# Patient Record
Sex: FEMALE | Race: WHITE | Employment: OTHER | URBAN - METROPOLITAN AREA
[De-identification: names, ages, dates, MRNs, and addresses within clinical notes are randomized per-mention and may not be internally consistent; named-entity substitution may affect disease eponyms.]

---

## 2017-10-23 ENCOUNTER — HOSPITAL ENCOUNTER (OUTPATIENT)
Age: 67
Setting detail: OUTPATIENT SURGERY
Discharge: HOME | End: 2017-10-23
Attending: SPECIALIST | Admitting: SPECIALIST
Payer: COMMERCIAL

## 2017-10-23 ENCOUNTER — ANESTHESIA (OUTPATIENT)
Dept: ENDOSCOPY | Age: 67
End: 2017-10-23
Payer: COMMERCIAL

## 2017-10-23 ENCOUNTER — ANESTHESIA EVENT (OUTPATIENT)
Dept: ENDOSCOPY | Age: 67
End: 2017-10-23
Payer: COMMERCIAL

## 2017-10-23 VITALS
DIASTOLIC BLOOD PRESSURE: 67 MMHG | RESPIRATION RATE: 23 BRPM | SYSTOLIC BLOOD PRESSURE: 142 MMHG | OXYGEN SATURATION: 99 %

## 2017-10-23 VITALS
OXYGEN SATURATION: 99 % | HEART RATE: 88 BPM | RESPIRATION RATE: 16 BRPM | DIASTOLIC BLOOD PRESSURE: 73 MMHG | HEIGHT: 65 IN | WEIGHT: 164 LBS | TEMPERATURE: 97.9 F | SYSTOLIC BLOOD PRESSURE: 142 MMHG | BODY MASS INDEX: 27.32 KG/M2

## 2017-10-23 PROCEDURE — 7100000010 HC PHASE II RECOVERY - FIRST 15 MIN: Performed by: SPECIALIST

## 2017-10-23 PROCEDURE — 3700000000 HC ANESTHESIA ATTENDED CARE: Performed by: SPECIALIST

## 2017-10-23 PROCEDURE — 3609027000 HC COLONOSCOPY: Performed by: SPECIALIST

## 2017-10-23 PROCEDURE — 88305 TISSUE EXAM BY PATHOLOGIST: CPT

## 2017-10-23 PROCEDURE — 3700000001 HC ADD 15 MINUTES (ANESTHESIA): Performed by: SPECIALIST

## 2017-10-23 PROCEDURE — 2500000003 HC RX 250 WO HCPCS: Performed by: NURSE ANESTHETIST, CERTIFIED REGISTERED

## 2017-10-23 PROCEDURE — 6360000002 HC RX W HCPCS: Performed by: NURSE ANESTHETIST, CERTIFIED REGISTERED

## 2017-10-23 PROCEDURE — 2580000003 HC RX 258: Performed by: SPECIALIST

## 2017-10-23 PROCEDURE — 7100000011 HC PHASE II RECOVERY - ADDTL 15 MIN: Performed by: SPECIALIST

## 2017-10-23 RX ORDER — ACETAMINOPHEN, ASPIRIN AND CAFFEINE 250; 250; 65 MG/1; MG/1; MG/1
1 TABLET, FILM COATED ORAL EVERY 6 HOURS PRN
COMMUNITY
End: 2019-10-11

## 2017-10-23 RX ORDER — PROPOFOL 10 MG/ML
INJECTION, EMULSION INTRAVENOUS PRN
Status: DISCONTINUED | OUTPATIENT
Start: 2017-10-23 | End: 2017-10-23 | Stop reason: SDUPTHER

## 2017-10-23 RX ORDER — LIDOCAINE HYDROCHLORIDE 10 MG/ML
INJECTION, SOLUTION INFILTRATION; PERINEURAL PRN
Status: DISCONTINUED | OUTPATIENT
Start: 2017-10-23 | End: 2017-10-23 | Stop reason: SDUPTHER

## 2017-10-23 RX ORDER — IBUPROFEN 200 MG
1 CAPSULE ORAL DAILY
COMMUNITY

## 2017-10-23 RX ORDER — LOSARTAN POTASSIUM 100 MG/1
100 TABLET ORAL DAILY
COMMUNITY

## 2017-10-23 RX ORDER — PREDNISONE 20 MG/1
10 TABLET ORAL DAILY
COMMUNITY
End: 2019-10-11 | Stop reason: ALTCHOICE

## 2017-10-23 RX ORDER — TRAZODONE HYDROCHLORIDE 100 MG/1
100 TABLET ORAL NIGHTLY
COMMUNITY

## 2017-10-23 RX ORDER — SODIUM CHLORIDE 0.9 % (FLUSH) 0.9 %
10 SYRINGE (ML) INJECTION PRN
Status: DISCONTINUED | OUTPATIENT
Start: 2017-10-23 | End: 2017-10-23 | Stop reason: HOSPADM

## 2017-10-23 RX ORDER — DULOXETIN HYDROCHLORIDE 30 MG/1
30 CAPSULE, DELAYED RELEASE ORAL DAILY
COMMUNITY

## 2017-10-23 RX ORDER — LEVOTHYROXINE AND LIOTHYRONINE 38; 9 UG/1; UG/1
60 TABLET ORAL DAILY
COMMUNITY

## 2017-10-23 RX ORDER — DULOXETIN HYDROCHLORIDE 60 MG/1
60 CAPSULE, DELAYED RELEASE ORAL EVERY EVENING
COMMUNITY

## 2017-10-23 RX ORDER — GLYCOPYRROLATE 0.2 MG/ML
INJECTION INTRAMUSCULAR; INTRAVENOUS PRN
Status: DISCONTINUED | OUTPATIENT
Start: 2017-10-23 | End: 2017-10-23 | Stop reason: SDUPTHER

## 2017-10-23 RX ORDER — SODIUM CHLORIDE 0.9 % (FLUSH) 0.9 %
10 SYRINGE (ML) INJECTION EVERY 12 HOURS SCHEDULED
Status: DISCONTINUED | OUTPATIENT
Start: 2017-10-23 | End: 2017-10-23 | Stop reason: HOSPADM

## 2017-10-23 RX ORDER — SODIUM CHLORIDE 9 MG/ML
INJECTION, SOLUTION INTRAVENOUS CONTINUOUS
Status: DISCONTINUED | OUTPATIENT
Start: 2017-10-23 | End: 2017-10-23 | Stop reason: HOSPADM

## 2017-10-23 RX ORDER — LIDOCAINE HYDROCHLORIDE 10 MG/ML
1 INJECTION, SOLUTION EPIDURAL; INFILTRATION; INTRACAUDAL; PERINEURAL
Status: DISCONTINUED | OUTPATIENT
Start: 2017-10-23 | End: 2017-10-23 | Stop reason: HOSPADM

## 2017-10-23 RX ORDER — CYCLOSPORINE 100 MG/1
100 CAPSULE, GELATIN COATED ORAL 2 TIMES DAILY
COMMUNITY
End: 2018-08-02 | Stop reason: ALTCHOICE

## 2017-10-23 RX ORDER — ONDANSETRON 2 MG/ML
4 INJECTION INTRAMUSCULAR; INTRAVENOUS
Status: DISCONTINUED | OUTPATIENT
Start: 2017-10-23 | End: 2017-10-23 | Stop reason: HOSPADM

## 2017-10-23 RX ADMIN — SODIUM CHLORIDE: 9 INJECTION, SOLUTION INTRAVENOUS at 11:47

## 2017-10-23 RX ADMIN — GLYCOPYRROLATE 0.2 MG: 0.2 INJECTION INTRAMUSCULAR; INTRAVENOUS at 12:07

## 2017-10-23 RX ADMIN — PROPOFOL 20 MG: 10 INJECTION, EMULSION INTRAVENOUS at 12:05

## 2017-10-23 RX ADMIN — PROPOFOL 20 MG: 10 INJECTION, EMULSION INTRAVENOUS at 12:15

## 2017-10-23 RX ADMIN — PROPOFOL 20 MG: 10 INJECTION, EMULSION INTRAVENOUS at 12:00

## 2017-10-23 RX ADMIN — PROPOFOL 20 MG: 10 INJECTION, EMULSION INTRAVENOUS at 11:56

## 2017-10-23 RX ADMIN — PROPOFOL 30 MG: 10 INJECTION, EMULSION INTRAVENOUS at 11:53

## 2017-10-23 RX ADMIN — PROPOFOL 20 MG: 10 INJECTION, EMULSION INTRAVENOUS at 12:04

## 2017-10-23 RX ADMIN — PROPOFOL 20 MG: 10 INJECTION, EMULSION INTRAVENOUS at 12:07

## 2017-10-23 RX ADMIN — PROPOFOL 10 MG: 10 INJECTION, EMULSION INTRAVENOUS at 12:13

## 2017-10-23 RX ADMIN — PROPOFOL 20 MG: 10 INJECTION, EMULSION INTRAVENOUS at 12:01

## 2017-10-23 RX ADMIN — PROPOFOL 20 MG: 10 INJECTION, EMULSION INTRAVENOUS at 11:54

## 2017-10-23 RX ADMIN — PROPOFOL 20 MG: 10 INJECTION, EMULSION INTRAVENOUS at 12:02

## 2017-10-23 RX ADMIN — PROPOFOL 20 MG: 10 INJECTION, EMULSION INTRAVENOUS at 12:11

## 2017-10-23 RX ADMIN — PROPOFOL 20 MG: 10 INJECTION, EMULSION INTRAVENOUS at 11:58

## 2017-10-23 RX ADMIN — PROPOFOL 20 MG: 10 INJECTION, EMULSION INTRAVENOUS at 12:12

## 2017-10-23 RX ADMIN — PROPOFOL 100 MG: 10 INJECTION, EMULSION INTRAVENOUS at 11:52

## 2017-10-23 RX ADMIN — PROPOFOL 20 MG: 10 INJECTION, EMULSION INTRAVENOUS at 12:09

## 2017-10-23 RX ADMIN — PROPOFOL 20 MG: 10 INJECTION, EMULSION INTRAVENOUS at 12:17

## 2017-10-23 RX ADMIN — LIDOCAINE HYDROCHLORIDE 30 MG: 10 INJECTION, SOLUTION INFILTRATION; PERINEURAL at 11:52

## 2017-10-23 ASSESSMENT — PAIN - FUNCTIONAL ASSESSMENT: PAIN_FUNCTIONAL_ASSESSMENT: 0-10

## 2017-10-23 NOTE — ANESTHESIA PRE PROCEDURE
PRN Aron Lambert MD        lidocaine PF 1 % injection 1 mL  1 mL Intradermal Once PRN Aron Lambert MD           Allergies:  No Known Allergies    Problem List:  There is no problem list on file for this patient. Past Medical History:        Diagnosis Date    Factor 5 Leiden mutation, heterozygous ([address]     Fibromyalgia     Hx of blood clots     leg    Hyperlipidemia     Pyodermic gangrenosum        Past Surgical History:        Procedure Laterality Date    HIP ARTHROPLASTY Right 2014       Social History:    Social History   Substance Use Topics    Smoking status: Not on file    Smokeless tobacco: Not on file    Alcohol use Not on file                                Counseling given: Not Answered      Vital Signs (Current):   Vitals:    10/23/17 1113   BP: (!) 178/99   Pulse: 98   Resp: 16   Temp: 36.6 °C (97.9 °F)   TempSrc: Temporal   SpO2: 99%   Weight: 164 lb (74.4 kg)   Height: 5' 5\" (1.651 m)                                              BP Readings from Last 3 Encounters:   10/23/17 (!) 178/99       NPO Status:                                                                                 BMI:   Wt Readings from Last 3 Encounters:   10/23/17 164 lb (74.4 kg)     Body mass index is 27.29 kg/m².     CBC:   Lab Results   Component Value Date    WBC 8.3 10/10/2014    RBC 4.61 10/10/2014    HGB 14.5 10/10/2014    HCT 44.5 10/10/2014    MCV 96.6 10/10/2014    RDW 13.2 10/10/2014     10/10/2014       CMP:   Lab Results   Component Value Date     10/10/2014    K 3.8 10/10/2014     10/10/2014    CO2 22 10/10/2014    BUN 14 10/10/2014    CREATININE 0.40 10/10/2014    GFRAA >60.0 10/10/2014    LABGLOM >60.0 10/10/2014    GLUCOSE 64 10/10/2014    PROT 6.0 07/22/2014    CALCIUM 9.1 10/10/2014    BILITOT 0.3 07/22/2014    ALKPHOS 90 07/22/2014    AST 18 07/22/2014    ALT 17 07/22/2014       POC Tests: No results for input(s): POCGLU, POCNA, POCK, POCCL, POCBUN, POCHEMO, POCHCT in the last 72 hours. Coags:   Lab Results   Component Value Date    PROTIME 10.4 10/10/2014    INR 1.0 10/10/2014    APTT 27.3 10/10/2014       HCG (If Applicable): No results found for: PREGTESTUR, PREGSERUM, HCG, HCGQUANT     ABGs: No results found for: PHART, PO2ART, KTW6BYJ, MQF6IEH, BEART, L6YVDPDC     Type & Screen (If Applicable):  No results found for: LABABO, [address]    Anesthesia Evaluation  Patient summary reviewed and Nursing notes reviewed  Airway: Mallampati: II  TM distance: >3 FB   Neck ROM: full  Mouth opening: > = 3 FB Dental: normal exam         Pulmonary:negative ROS and normal exam                               Cardiovascular:    (+) hyperlipidemia                  Neuro/Psych:   {neg ROS              GI/Hepatic/Renal: neg ROS            Endo/Other: negative ROS                    Abdominal:           Vascular:                                      Anesthesia Plan      MAC     ASA 2             Anesthetic plan and risks discussed with patient. Plan discussed with CRNA.                   Josiane Andujar CRNA   10/23/2017

## 2017-12-26 ENCOUNTER — OFFICE VISIT (OUTPATIENT)
Dept: CARDIOLOGY | Age: 67
End: 2017-12-26

## 2017-12-26 VITALS
HEIGHT: 65 IN | BODY MASS INDEX: 27.82 KG/M2 | DIASTOLIC BLOOD PRESSURE: 90 MMHG | WEIGHT: 167 LBS | HEART RATE: 104 BPM | SYSTOLIC BLOOD PRESSURE: 160 MMHG

## 2017-12-26 DIAGNOSIS — Z72.0 TOBACCO ABUSE: ICD-10-CM

## 2017-12-26 DIAGNOSIS — I10 ESSENTIAL HYPERTENSION: Primary | ICD-10-CM

## 2017-12-26 DIAGNOSIS — I70.229 CRITICAL LOWER LIMB ISCHEMIA (HCC): ICD-10-CM

## 2017-12-26 PROCEDURE — 93000 ELECTROCARDIOGRAM COMPLETE: CPT | Performed by: INTERNAL MEDICINE

## 2017-12-26 PROCEDURE — G8484 FLU IMMUNIZE NO ADMIN: HCPCS | Performed by: INTERNAL MEDICINE

## 2017-12-26 PROCEDURE — 3014F SCREEN MAMMO DOC REV: CPT | Performed by: INTERNAL MEDICINE

## 2017-12-26 PROCEDURE — 4040F PNEUMOC VAC/ADMIN/RCVD: CPT | Performed by: INTERNAL MEDICINE

## 2017-12-26 PROCEDURE — G8400 PT W/DXA NO RESULTS DOC: HCPCS | Performed by: INTERNAL MEDICINE

## 2017-12-26 PROCEDURE — G8427 DOCREV CUR MEDS BY ELIG CLIN: HCPCS | Performed by: INTERNAL MEDICINE

## 2017-12-26 PROCEDURE — 3017F COLORECTAL CA SCREEN DOC REV: CPT | Performed by: INTERNAL MEDICINE

## 2017-12-26 PROCEDURE — 99204 OFFICE O/P NEW MOD 45 MIN: CPT | Performed by: INTERNAL MEDICINE

## 2017-12-26 PROCEDURE — 1123F ACP DISCUSS/DSCN MKR DOCD: CPT | Performed by: INTERNAL MEDICINE

## 2017-12-26 PROCEDURE — 4004F PT TOBACCO SCREEN RCVD TLK: CPT | Performed by: INTERNAL MEDICINE

## 2017-12-26 PROCEDURE — 1090F PRES/ABSN URINE INCON ASSESS: CPT | Performed by: INTERNAL MEDICINE

## 2017-12-26 PROCEDURE — G8419 CALC BMI OUT NRM PARAM NOF/U: HCPCS | Performed by: INTERNAL MEDICINE

## 2017-12-29 ENCOUNTER — HOSPITAL ENCOUNTER (OUTPATIENT)
Dept: CT IMAGING | Age: 67
Discharge: HOME | End: 2017-12-29
Payer: COMMERCIAL

## 2017-12-29 VITALS — SYSTOLIC BLOOD PRESSURE: 152 MMHG | HEART RATE: 105 BPM | RESPIRATION RATE: 20 BRPM | DIASTOLIC BLOOD PRESSURE: 89 MMHG

## 2017-12-29 DIAGNOSIS — I70.229 CRITICAL LOWER LIMB ISCHEMIA (HCC): ICD-10-CM

## 2017-12-29 PROCEDURE — 2580000003 HC RX 258: Performed by: INTERNAL MEDICINE

## 2017-12-29 PROCEDURE — 6360000004 HC RX CONTRAST MEDICATION: Performed by: INTERNAL MEDICINE

## 2017-12-29 PROCEDURE — 75635 CT ANGIO ABDOMINAL ARTERIES: CPT

## 2017-12-29 RX ORDER — SODIUM CHLORIDE 0.9 % (FLUSH) 0.9 %
10 SYRINGE (ML) INJECTION PRN
Status: DISCONTINUED | OUTPATIENT
Start: 2017-12-29 | End: 2018-01-01 | Stop reason: HOSPADM

## 2017-12-29 RX ADMIN — IOPAMIDOL 125 ML: 612 INJECTION, SOLUTION INTRAVENOUS at 14:16

## 2017-12-29 RX ADMIN — SODIUM CHLORIDE, PRESERVATIVE FREE 10 ML: 5 INJECTION INTRAVENOUS at 14:16

## 2018-01-09 ENCOUNTER — OFFICE VISIT (OUTPATIENT)
Dept: CARDIOLOGY | Age: 68
End: 2018-01-09

## 2018-01-09 VITALS
HEIGHT: 65 IN | OXYGEN SATURATION: 98 % | WEIGHT: 168 LBS | BODY MASS INDEX: 27.99 KG/M2 | DIASTOLIC BLOOD PRESSURE: 70 MMHG | HEART RATE: 100 BPM | SYSTOLIC BLOOD PRESSURE: 136 MMHG

## 2018-01-09 DIAGNOSIS — I70.229 CRITICAL LOWER LIMB ISCHEMIA (HCC): Primary | ICD-10-CM

## 2018-01-09 DIAGNOSIS — I73.9 PAD (PERIPHERAL ARTERY DISEASE) (HCC): ICD-10-CM

## 2018-01-09 DIAGNOSIS — Z72.0 TOBACCO ABUSE: ICD-10-CM

## 2018-01-09 PROCEDURE — 3017F COLORECTAL CA SCREEN DOC REV: CPT | Performed by: INTERNAL MEDICINE

## 2018-01-09 PROCEDURE — G8427 DOCREV CUR MEDS BY ELIG CLIN: HCPCS | Performed by: INTERNAL MEDICINE

## 2018-01-09 PROCEDURE — 1123F ACP DISCUSS/DSCN MKR DOCD: CPT | Performed by: INTERNAL MEDICINE

## 2018-01-09 PROCEDURE — 4004F PT TOBACCO SCREEN RCVD TLK: CPT | Performed by: INTERNAL MEDICINE

## 2018-01-09 PROCEDURE — G8419 CALC BMI OUT NRM PARAM NOF/U: HCPCS | Performed by: INTERNAL MEDICINE

## 2018-01-09 PROCEDURE — 99214 OFFICE O/P EST MOD 30 MIN: CPT | Performed by: INTERNAL MEDICINE

## 2018-01-09 PROCEDURE — G8400 PT W/DXA NO RESULTS DOC: HCPCS | Performed by: INTERNAL MEDICINE

## 2018-01-09 PROCEDURE — 1090F PRES/ABSN URINE INCON ASSESS: CPT | Performed by: INTERNAL MEDICINE

## 2018-01-09 PROCEDURE — G8484 FLU IMMUNIZE NO ADMIN: HCPCS | Performed by: INTERNAL MEDICINE

## 2018-01-09 PROCEDURE — 3014F SCREEN MAMMO DOC REV: CPT | Performed by: INTERNAL MEDICINE

## 2018-01-09 PROCEDURE — 4040F PNEUMOC VAC/ADMIN/RCVD: CPT | Performed by: INTERNAL MEDICINE

## 2018-01-17 ENCOUNTER — TELEPHONE (OUTPATIENT)
Dept: CARDIOLOGY | Age: 68
End: 2018-01-17

## 2018-01-24 ENCOUNTER — HOSPITAL ENCOUNTER (OUTPATIENT)
Dept: CARDIAC CATH/INVASIVE PROCEDURES | Age: 68
Discharge: HOME | End: 2018-01-24
Attending: INTERNAL MEDICINE | Admitting: INTERNAL MEDICINE
Payer: COMMERCIAL

## 2018-01-24 VITALS
HEIGHT: 65 IN | HEART RATE: 107 BPM | WEIGHT: 158 LBS | SYSTOLIC BLOOD PRESSURE: 169 MMHG | OXYGEN SATURATION: 100 % | BODY MASS INDEX: 26.33 KG/M2 | RESPIRATION RATE: 21 BRPM | DIASTOLIC BLOOD PRESSURE: 86 MMHG | TEMPERATURE: 97.6 F

## 2018-01-24 DIAGNOSIS — I70.229 CRITICAL LOWER LIMB ISCHEMIA (HCC): ICD-10-CM

## 2018-01-24 DIAGNOSIS — I73.9 PAD (PERIPHERAL ARTERY DISEASE) (HCC): ICD-10-CM

## 2018-01-24 LAB
ABO/RH: NORMAL
ANION GAP SERPL CALCULATED.3IONS-SCNC: 13 MEQ/L (ref 7–13)
ANTIBODY SCREEN: NORMAL
BUN BLDV-MCNC: 20 MG/DL (ref 8–23)
CALCIUM SERPL-MCNC: 9.3 MG/DL (ref 8.6–10.2)
CHLORIDE BLD-SCNC: 107 MEQ/L (ref 98–107)
CO2: 22 MEQ/L (ref 22–29)
CREAT SERPL-MCNC: 0.83 MG/DL (ref 0.5–0.9)
EKG ATRIAL RATE: 107 BPM
EKG ATRIAL RATE: 94 BPM
EKG P AXIS: 44 DEGREES
EKG P AXIS: 56 DEGREES
EKG P-R INTERVAL: 172 MS
EKG P-R INTERVAL: 176 MS
EKG Q-T INTERVAL: 384 MS
EKG Q-T INTERVAL: 392 MS
EKG QRS DURATION: 128 MS
EKG QRS DURATION: 130 MS
EKG QTC CALCULATION (BAZETT): 490 MS
EKG QTC CALCULATION (BAZETT): 512 MS
EKG R AXIS: -55 DEGREES
EKG R AXIS: -57 DEGREES
EKG T AXIS: 29 DEGREES
EKG T AXIS: 35 DEGREES
EKG VENTRICULAR RATE: 107 BPM
EKG VENTRICULAR RATE: 94 BPM
GFR AFRICAN AMERICAN: >60
GFR NON-AFRICAN AMERICAN: >60
GLUCOSE BLD-MCNC: 80 MG/DL (ref 74–109)
HCT VFR BLD CALC: 37.8 % (ref 37–47)
HEMOGLOBIN: 12 G/DL (ref 12–16)
MCH RBC QN AUTO: 29.3 PG (ref 27–31.3)
MCHC RBC AUTO-ENTMCNC: 31.6 % (ref 33–37)
MCV RBC AUTO: 92.6 FL (ref 82–100)
PDW BLD-RTO: 16.1 % (ref 11.5–14.5)
PLATELET # BLD: 512 K/UL (ref 130–400)
POTASSIUM REFLEX MAGNESIUM: 4.4 MEQ/L (ref 3.5–5.1)
RBC # BLD: 4.08 M/UL (ref 4.2–5.4)
SODIUM BLD-SCNC: 142 MEQ/L (ref 132–144)
WBC # BLD: 10.7 K/UL (ref 4.8–10.8)

## 2018-01-24 PROCEDURE — 2580000003 HC RX 258

## 2018-01-24 PROCEDURE — 2500000003 HC RX 250 WO HCPCS

## 2018-01-24 PROCEDURE — 93005 ELECTROCARDIOGRAM TRACING: CPT

## 2018-01-24 PROCEDURE — 86901 BLOOD TYPING SEROLOGIC RH(D): CPT

## 2018-01-24 PROCEDURE — 36200 PLACE CATHETER IN AORTA: CPT | Performed by: INTERNAL MEDICINE

## 2018-01-24 PROCEDURE — 75625 CONTRAST EXAM ABDOMINL AORTA: CPT | Performed by: INTERNAL MEDICINE

## 2018-01-24 PROCEDURE — C1725 CATH, TRANSLUMIN NON-LASER: HCPCS

## 2018-01-24 PROCEDURE — 6360000002 HC RX W HCPCS

## 2018-01-24 PROCEDURE — 6370000000 HC RX 637 (ALT 250 FOR IP): Performed by: INTERNAL MEDICINE

## 2018-01-24 PROCEDURE — 86850 RBC ANTIBODY SCREEN: CPT

## 2018-01-24 PROCEDURE — C1769 GUIDE WIRE: HCPCS

## 2018-01-24 PROCEDURE — 86900 BLOOD TYPING SEROLOGIC ABO: CPT

## 2018-01-24 PROCEDURE — 80048 BASIC METABOLIC PNL TOTAL CA: CPT

## 2018-01-24 PROCEDURE — 75716 ARTERY X-RAYS ARMS/LEGS: CPT | Performed by: INTERNAL MEDICINE

## 2018-01-24 PROCEDURE — 75774 ARTERY X-RAY EACH VESSEL: CPT | Performed by: INTERNAL MEDICINE

## 2018-01-24 PROCEDURE — 93010 ELECTROCARDIOGRAM REPORT: CPT | Performed by: INTERNAL MEDICINE

## 2018-01-24 PROCEDURE — 2580000003 HC RX 258: Performed by: INTERNAL MEDICINE

## 2018-01-24 PROCEDURE — 36245 INS CATH ABD/L-EXT ART 1ST: CPT | Performed by: INTERNAL MEDICINE

## 2018-01-24 PROCEDURE — C1894 INTRO/SHEATH, NON-LASER: HCPCS

## 2018-01-24 PROCEDURE — 85027 COMPLETE CBC AUTOMATED: CPT

## 2018-01-24 RX ORDER — CIPROFLOXACIN 500 MG/1
500 TABLET, FILM COATED ORAL 2 TIMES DAILY
COMMUNITY
Start: 2018-01-17 | End: 2018-01-26

## 2018-01-24 RX ORDER — DIAZEPAM 5 MG/1
5 TABLET ORAL
Status: DISCONTINUED | OUTPATIENT
Start: 2018-01-24 | End: 2018-01-24 | Stop reason: HOSPADM

## 2018-01-24 RX ORDER — HYDRALAZINE HYDROCHLORIDE 20 MG/ML
10 INJECTION INTRAMUSCULAR; INTRAVENOUS EVERY 10 MIN PRN
Status: DISCONTINUED | OUTPATIENT
Start: 2018-01-24 | End: 2018-01-24 | Stop reason: HOSPADM

## 2018-01-24 RX ORDER — ACETAMINOPHEN 325 MG/1
650 TABLET ORAL EVERY 4 HOURS PRN
Status: DISCONTINUED | OUTPATIENT
Start: 2018-01-24 | End: 2018-01-24 | Stop reason: HOSPADM

## 2018-01-24 RX ORDER — LABETALOL HYDROCHLORIDE 5 MG/ML
10 INJECTION, SOLUTION INTRAVENOUS EVERY 30 MIN PRN
Status: DISCONTINUED | OUTPATIENT
Start: 2018-01-24 | End: 2018-01-24 | Stop reason: HOSPADM

## 2018-01-24 RX ORDER — ONDANSETRON 2 MG/ML
4 INJECTION INTRAMUSCULAR; INTRAVENOUS EVERY 6 HOURS PRN
Status: DISCONTINUED | OUTPATIENT
Start: 2018-01-24 | End: 2018-01-24 | Stop reason: HOSPADM

## 2018-01-24 RX ORDER — SODIUM CHLORIDE 0.9 % (FLUSH) 0.9 %
10 SYRINGE (ML) INJECTION PRN
Status: DISCONTINUED | OUTPATIENT
Start: 2018-01-24 | End: 2018-01-24 | Stop reason: HOSPADM

## 2018-01-24 RX ORDER — SODIUM CHLORIDE 9 MG/ML
INJECTION, SOLUTION INTRAVENOUS CONTINUOUS
Status: DISCONTINUED | OUTPATIENT
Start: 2018-01-24 | End: 2018-01-24 | Stop reason: HOSPADM

## 2018-01-24 RX ORDER — SODIUM CHLORIDE 0.9 % (FLUSH) 0.9 %
10 SYRINGE (ML) INJECTION EVERY 12 HOURS SCHEDULED
Status: DISCONTINUED | OUTPATIENT
Start: 2018-01-24 | End: 2018-01-24 | Stop reason: HOSPADM

## 2018-01-24 RX ORDER — ASPIRIN 81 MG/1
81 TABLET, CHEWABLE ORAL ONCE
Status: COMPLETED | OUTPATIENT
Start: 2018-01-24 | End: 2018-01-24

## 2018-01-24 RX ORDER — NITROGLYCERIN 0.4 MG/1
0.4 TABLET SUBLINGUAL EVERY 5 MIN PRN
Status: DISCONTINUED | OUTPATIENT
Start: 2018-01-24 | End: 2018-01-24 | Stop reason: HOSPADM

## 2018-01-24 RX ORDER — ALPRAZOLAM 0.5 MG/1
0.5 TABLET ORAL
Status: DISCONTINUED | OUTPATIENT
Start: 2018-01-24 | End: 2018-01-24 | Stop reason: HOSPADM

## 2018-01-24 RX ADMIN — ASPIRIN 81 MG 81 MG: 81 TABLET ORAL at 07:27

## 2018-01-24 RX ADMIN — SODIUM CHLORIDE: 9 INJECTION, SOLUTION INTRAVENOUS at 07:27

## 2018-01-24 NOTE — PROGRESS NOTES
Elder Settles is holding manual pressure and patient's heart rate is flucuating normal sinus rhythm to sinus tach.

## 2018-01-24 NOTE — PROGRESS NOTES
Patient returned from cath lab. 4Fr sheath in left groin. Site is soft and no bleeding. Vitals are stable.

## 2018-01-25 ENCOUNTER — TELEPHONE (OUTPATIENT)
Dept: CARDIOLOGY | Age: 68
End: 2018-01-25

## 2018-01-25 DIAGNOSIS — I49.9 IRREGULAR HEART RATE: Primary | ICD-10-CM

## 2018-01-25 PROCEDURE — 93010 ELECTROCARDIOGRAM REPORT: CPT | Performed by: INTERNAL MEDICINE

## 2018-02-05 ENCOUNTER — NURSE ONLY (OUTPATIENT)
Dept: CARDIOLOGY CLINIC | Age: 68
End: 2018-02-05

## 2018-02-05 DIAGNOSIS — I49.9 IRREGULAR HEART BEAT: Primary | ICD-10-CM

## 2018-02-07 ENCOUNTER — NURSE ONLY (OUTPATIENT)
Dept: CARDIOLOGY CLINIC | Age: 68
End: 2018-02-07

## 2018-02-07 DIAGNOSIS — I49.9 IRREGULAR HEART BEAT: Primary | ICD-10-CM

## 2018-02-08 ENCOUNTER — OFFICE VISIT (OUTPATIENT)
Dept: CARDIOLOGY CLINIC | Age: 68
End: 2018-02-08
Payer: COMMERCIAL

## 2018-02-08 VITALS
HEART RATE: 60 BPM | HEIGHT: 65 IN | DIASTOLIC BLOOD PRESSURE: 80 MMHG | SYSTOLIC BLOOD PRESSURE: 120 MMHG | WEIGHT: 169 LBS | BODY MASS INDEX: 28.16 KG/M2

## 2018-02-08 DIAGNOSIS — Z72.0 TOBACCO ABUSE: ICD-10-CM

## 2018-02-08 DIAGNOSIS — I73.9 PAD (PERIPHERAL ARTERY DISEASE) (HCC): ICD-10-CM

## 2018-02-08 DIAGNOSIS — I70.229 CRITICAL LOWER LIMB ISCHEMIA (HCC): Primary | ICD-10-CM

## 2018-02-08 PROCEDURE — 4040F PNEUMOC VAC/ADMIN/RCVD: CPT | Performed by: INTERNAL MEDICINE

## 2018-02-08 PROCEDURE — 99213 OFFICE O/P EST LOW 20 MIN: CPT | Performed by: INTERNAL MEDICINE

## 2018-02-08 PROCEDURE — 1123F ACP DISCUSS/DSCN MKR DOCD: CPT | Performed by: INTERNAL MEDICINE

## 2018-02-08 PROCEDURE — 4004F PT TOBACCO SCREEN RCVD TLK: CPT | Performed by: INTERNAL MEDICINE

## 2018-02-08 PROCEDURE — 1090F PRES/ABSN URINE INCON ASSESS: CPT | Performed by: INTERNAL MEDICINE

## 2018-02-08 PROCEDURE — G8427 DOCREV CUR MEDS BY ELIG CLIN: HCPCS | Performed by: INTERNAL MEDICINE

## 2018-02-08 PROCEDURE — G8400 PT W/DXA NO RESULTS DOC: HCPCS | Performed by: INTERNAL MEDICINE

## 2018-02-08 PROCEDURE — G8484 FLU IMMUNIZE NO ADMIN: HCPCS | Performed by: INTERNAL MEDICINE

## 2018-02-08 PROCEDURE — G8419 CALC BMI OUT NRM PARAM NOF/U: HCPCS | Performed by: INTERNAL MEDICINE

## 2018-02-08 PROCEDURE — 3017F COLORECTAL CA SCREEN DOC REV: CPT | Performed by: INTERNAL MEDICINE

## 2018-02-08 PROCEDURE — 3014F SCREEN MAMMO DOC REV: CPT | Performed by: INTERNAL MEDICINE

## 2018-02-08 RX ORDER — METOPROLOL TARTRATE 50 MG/1
25 TABLET, FILM COATED ORAL 2 TIMES DAILY
Qty: 180 TABLET | Refills: 3 | Status: SHIPPED | OUTPATIENT
Start: 2018-02-08 | End: 2019-04-21 | Stop reason: SDUPTHER

## 2018-08-02 ENCOUNTER — OFFICE VISIT (OUTPATIENT)
Dept: CARDIOLOGY CLINIC | Age: 68
End: 2018-08-02
Payer: COMMERCIAL

## 2018-08-02 VITALS
HEART RATE: 72 BPM | WEIGHT: 171 LBS | OXYGEN SATURATION: 94 % | BODY MASS INDEX: 28.49 KG/M2 | SYSTOLIC BLOOD PRESSURE: 130 MMHG | RESPIRATION RATE: 14 BRPM | HEIGHT: 65 IN | DIASTOLIC BLOOD PRESSURE: 74 MMHG

## 2018-08-02 DIAGNOSIS — Z72.0 TOBACCO ABUSE: ICD-10-CM

## 2018-08-02 DIAGNOSIS — Z01.818 PRE-OPERATIVE CLEARANCE: ICD-10-CM

## 2018-08-02 DIAGNOSIS — Z01.810 PRE-OPERATIVE CARDIOVASCULAR EXAMINATION: Primary | ICD-10-CM

## 2018-08-02 DIAGNOSIS — I73.9 PAD (PERIPHERAL ARTERY DISEASE) (HCC): ICD-10-CM

## 2018-08-02 PROCEDURE — 4004F PT TOBACCO SCREEN RCVD TLK: CPT | Performed by: INTERNAL MEDICINE

## 2018-08-02 PROCEDURE — G8427 DOCREV CUR MEDS BY ELIG CLIN: HCPCS | Performed by: INTERNAL MEDICINE

## 2018-08-02 PROCEDURE — 4040F PNEUMOC VAC/ADMIN/RCVD: CPT | Performed by: INTERNAL MEDICINE

## 2018-08-02 PROCEDURE — G8400 PT W/DXA NO RESULTS DOC: HCPCS | Performed by: INTERNAL MEDICINE

## 2018-08-02 PROCEDURE — 1123F ACP DISCUSS/DSCN MKR DOCD: CPT | Performed by: INTERNAL MEDICINE

## 2018-08-02 PROCEDURE — 1101F PT FALLS ASSESS-DOCD LE1/YR: CPT | Performed by: INTERNAL MEDICINE

## 2018-08-02 PROCEDURE — G8419 CALC BMI OUT NRM PARAM NOF/U: HCPCS | Performed by: INTERNAL MEDICINE

## 2018-08-02 PROCEDURE — 3017F COLORECTAL CA SCREEN DOC REV: CPT | Performed by: INTERNAL MEDICINE

## 2018-08-02 PROCEDURE — 1090F PRES/ABSN URINE INCON ASSESS: CPT | Performed by: INTERNAL MEDICINE

## 2018-08-02 PROCEDURE — 99213 OFFICE O/P EST LOW 20 MIN: CPT | Performed by: INTERNAL MEDICINE

## 2018-08-02 NOTE — PROGRESS NOTES
Chief Complaint   Patient presents with    Cardiac Clearance     Left Hip replacement 8/22/2018-[facility] kari Mckeon done 7/31/18 @ [facility]       12-26-17: Patient presents for initial medical evaluation. Patient is followed on a regular basis by Dr. Kaylee Scott MD. Was noted to have elevated HR at PCP office. Underwent some testing at [facility] and checked out ok. Does have right medial tibial wound, pyoderma gangrenosum, following with DR. Rucker at [facility] wound care center. Wound has been present since 5/2016 and is slow to heal. Has undergone many HBC tx and a skin graft. Was seen at [address] [facility] vein clinic and given stockings. EKG with ST at 104bpm today with RBBB. No hx of MI, CHF or arrhythmia. She does smoke 1ppd for 40+ years or so. No hx of DM. No recent stress test or LHC. 1-9-18: s/p abnormal b/l LE CTA with below the knee disease. Also reviewing the CTA myself there is a suspecious area in right CFA. Right LE wound is still slow to heal . Compliant with meds. Pt denies chest pain, dyspnea, dyspnea on exertion, change in exercise capacity, fatigue,  nausea, vomiting, diarrhea, constipation, motor weakness, insomnia, weight loss, syncope, dizziness, lightheadedness, palpitations, PND, orthopnea. 2-8-18: s/p normal LE angio. Following up with [facility] wound care. Noted to have Sinus tach during and after procedure. She was started on lopressor 25mg BID. S/p 48 hour holter with sinus tach, no malignant tachy or alfonso arrhythmias. She is on Eliquis for hx of DVT in right LE. Following with dr. Helena Hodgkins for veins. Wound is getting a little better. Continues to smoke. Pt denies chest pain, dyspnea, dyspnea on exertion, change in exercise capacity, fatigue,  nausea, vomiting, diarrhea, constipation, motor weakness, insomnia, weight loss, syncope, dizziness, lightheadedness, palpitations, PND, orthopnea, or claudication. 8-22-18 her right medial ankle wound healed up finally.  Needs to have pre op clearance 180 tablet 3    thyroid (ARMOUR) 60 MG tablet Take 60 mg by mouth daily       lansoprazole (PREVACID SOLUTAB) 30 MG disintegrating tablet Take 30 mg by mouth daily      DULoxetine (CYMBALTA) 30 MG extended release capsule Take 30 mg by mouth daily      predniSONE (DELTASONE) 20 MG tablet Take 10 mg by mouth daily       Progesterone Micronized (PROGESTERONE PO) Take 300 mg by mouth daily      traZODone (DESYREL) 100 MG tablet Take 100 mg by mouth nightly      calcium carbonate (OYSTER SHELL CALCIUM 500 MG) 1250 (500 Ca) MG tablet Take 1 tablet by mouth daily      aspirin-acetaminophen-caffeine (EXCEDRIN MIGRAINE) 250-250-65 MG per tablet Take 1 tablet by mouth every 6 hours as needed for Headaches      DULoxetine (CYMBALTA) 60 MG extended release capsule Take 60 mg by mouth every evening      apixaban (ELIQUIS) 5 MG TABS tablet Take 5 mg by mouth 2 times daily      losartan (COZAAR) 100 MG tablet Take 100 mg by mouth daily       No current facility-administered medications for this visit. Patient has no known allergies. Review of Systems:  General ROS: negative  Psychological ROS: negative  Hematological and Lymphatic ROS: No history of blood clots or bleeding disorder. Respiratory ROS: no cough, shortness of breath, or wheezing  Cardiovascular ROS: no chest pain or dyspnea on exertion  Gastrointestinal ROS: no abdominal pain, change in bowel habits, or black or bloody stools  Genito-Urinary ROS: no dysuria, trouble voiding, or hematuria  Musculoskeletal ROS: negative  Neurological ROS: no TIA or stroke symptoms  Dermatological ROS: negative    VITALS:  Blood pressure 130/74, pulse 72, resp. rate 14, height 5' 5\" (1.651 m), weight 171 lb (77.6 kg), SpO2 94 %. Body mass index is 28.46 kg/m². Physical Examination:  General appearance - alert, well appearing, and in no distress  Mental status - alert, oriented to person, place, and time  Neck - Neck is supple, no JVD or carotid bruits.   No encounter. ASSESSMENT:     Diagnosis Orders   1. Pre-operative cardiovascular examination     2. PAD (peripheral artery disease) ([address]     3. Tobacco abuse     4. Pre-operative clearance     3. PAD. PLAN:     Patient will need to continue to follow up with you for their general medical care    As always, aggressive risk factor modification is strongly recommended. We should adhere to the [address] guidelines for HTN management and the NCEP ATP III guidelines for LDL-C management. Cardiac diet is always recommended with low fat, cholesterol, calories and sodium. Continue medications at current doses. Smoking cessation was strongly recommended    Patient is a intermediate risk patient for the proposed procedure/surgery. No active cardiac conditions such as ischemia, CHF or arrhythmias. Recomment peripoperative BBlocker, GI/DVT prophylaxis. Ok to hold eliquis 3 days prior to procedure. Will bridge with lovenox during those three days and hold morning of procedure. Patient was advised and encouraged to check blood pressure at home or at a pharmacy, maintain a logbook, and also call us back if blood pressure are above the target ranges or if it is low. Patient clearly understands and agrees to the instructions. We will need to continue to monitor muscle and liver enzymes, BUN, CR, and electrolytes.

## 2018-09-01 PROBLEM — Z01.818 PRE-OPERATIVE CLEARANCE: Status: RESOLVED | Noted: 2018-08-02 | Resolved: 2018-09-01

## 2019-04-22 RX ORDER — METOPROLOL TARTRATE 50 MG/1
TABLET, FILM COATED ORAL
Qty: 180 TABLET | Refills: 3 | Status: SHIPPED | OUTPATIENT
Start: 2019-04-22 | End: 2020-10-13 | Stop reason: ALTCHOICE

## 2019-10-11 ENCOUNTER — OFFICE VISIT (OUTPATIENT)
Dept: CARDIOLOGY CLINIC | Age: 69
End: 2019-10-11
Payer: COMMERCIAL

## 2019-10-11 VITALS
OXYGEN SATURATION: 96 % | BODY MASS INDEX: 30.99 KG/M2 | SYSTOLIC BLOOD PRESSURE: 126 MMHG | DIASTOLIC BLOOD PRESSURE: 76 MMHG | RESPIRATION RATE: 16 BRPM | WEIGHT: 186 LBS | HEART RATE: 71 BPM | HEIGHT: 65 IN

## 2019-10-11 DIAGNOSIS — E78.2 MIXED HYPERLIPIDEMIA: ICD-10-CM

## 2019-10-11 DIAGNOSIS — Z00.00 PE (PHYSICAL EXAM), ANNUAL: ICD-10-CM

## 2019-10-11 DIAGNOSIS — I73.9 PAD (PERIPHERAL ARTERY DISEASE) (HCC): Primary | ICD-10-CM

## 2019-10-11 DIAGNOSIS — Z86.718 HISTORY OF DEEP VENOUS THROMBOSIS (DVT) OF DISTAL VEIN OF RIGHT LOWER EXTREMITY: ICD-10-CM

## 2019-10-11 DIAGNOSIS — Z72.0 TOBACCO ABUSE: ICD-10-CM

## 2019-10-11 DIAGNOSIS — I70.229 CRITICAL LOWER LIMB ISCHEMIA (HCC): ICD-10-CM

## 2019-10-11 DIAGNOSIS — I10 ESSENTIAL HYPERTENSION: ICD-10-CM

## 2019-10-11 PROBLEM — E78.5 HYPERLIPIDEMIA: Status: ACTIVE | Noted: 2019-10-11

## 2019-10-11 PROCEDURE — G8427 DOCREV CUR MEDS BY ELIG CLIN: HCPCS | Performed by: INTERNAL MEDICINE

## 2019-10-11 PROCEDURE — 1090F PRES/ABSN URINE INCON ASSESS: CPT | Performed by: INTERNAL MEDICINE

## 2019-10-11 PROCEDURE — 93000 ELECTROCARDIOGRAM COMPLETE: CPT | Performed by: INTERNAL MEDICINE

## 2019-10-11 PROCEDURE — 3017F COLORECTAL CA SCREEN DOC REV: CPT | Performed by: INTERNAL MEDICINE

## 2019-10-11 PROCEDURE — 4004F PT TOBACCO SCREEN RCVD TLK: CPT | Performed by: INTERNAL MEDICINE

## 2019-10-11 PROCEDURE — G8400 PT W/DXA NO RESULTS DOC: HCPCS | Performed by: INTERNAL MEDICINE

## 2019-10-11 PROCEDURE — G8484 FLU IMMUNIZE NO ADMIN: HCPCS | Performed by: INTERNAL MEDICINE

## 2019-10-11 PROCEDURE — 1123F ACP DISCUSS/DSCN MKR DOCD: CPT | Performed by: INTERNAL MEDICINE

## 2019-10-11 PROCEDURE — 99214 OFFICE O/P EST MOD 30 MIN: CPT | Performed by: INTERNAL MEDICINE

## 2019-10-11 PROCEDURE — G8417 CALC BMI ABV UP PARAM F/U: HCPCS | Performed by: INTERNAL MEDICINE

## 2019-10-11 PROCEDURE — 4040F PNEUMOC VAC/ADMIN/RCVD: CPT | Performed by: INTERNAL MEDICINE

## 2020-10-13 ENCOUNTER — OFFICE VISIT (OUTPATIENT)
Dept: CARDIOLOGY CLINIC | Age: 70
End: 2020-10-13
Payer: MEDICARE

## 2020-10-13 VITALS
BODY MASS INDEX: 31.28 KG/M2 | WEIGHT: 188 LBS | TEMPERATURE: 97.5 F | HEART RATE: 54 BPM | DIASTOLIC BLOOD PRESSURE: 86 MMHG | SYSTOLIC BLOOD PRESSURE: 130 MMHG

## 2020-10-13 PROBLEM — I70.229 CRITICAL LOWER LIMB ISCHEMIA (HCC): Status: RESOLVED | Noted: 2017-12-26 | Resolved: 2020-10-13

## 2020-10-13 PROBLEM — I44.0 FIRST DEGREE AV BLOCK: Status: ACTIVE | Noted: 2020-10-13

## 2020-10-13 PROBLEM — I45.10 RBBB (RIGHT BUNDLE BRANCH BLOCK): Status: ACTIVE | Noted: 2020-10-13

## 2020-10-13 PROCEDURE — 4040F PNEUMOC VAC/ADMIN/RCVD: CPT | Performed by: INTERNAL MEDICINE

## 2020-10-13 PROCEDURE — 1123F ACP DISCUSS/DSCN MKR DOCD: CPT | Performed by: INTERNAL MEDICINE

## 2020-10-13 PROCEDURE — 93000 ELECTROCARDIOGRAM COMPLETE: CPT | Performed by: INTERNAL MEDICINE

## 2020-10-13 PROCEDURE — G8484 FLU IMMUNIZE NO ADMIN: HCPCS | Performed by: INTERNAL MEDICINE

## 2020-10-13 PROCEDURE — 3017F COLORECTAL CA SCREEN DOC REV: CPT | Performed by: INTERNAL MEDICINE

## 2020-10-13 PROCEDURE — G8417 CALC BMI ABV UP PARAM F/U: HCPCS | Performed by: INTERNAL MEDICINE

## 2020-10-13 PROCEDURE — 4004F PT TOBACCO SCREEN RCVD TLK: CPT | Performed by: INTERNAL MEDICINE

## 2020-10-13 PROCEDURE — G8427 DOCREV CUR MEDS BY ELIG CLIN: HCPCS | Performed by: INTERNAL MEDICINE

## 2020-10-13 PROCEDURE — 99214 OFFICE O/P EST MOD 30 MIN: CPT | Performed by: INTERNAL MEDICINE

## 2020-10-13 PROCEDURE — 1090F PRES/ABSN URINE INCON ASSESS: CPT | Performed by: INTERNAL MEDICINE

## 2020-10-13 PROCEDURE — G8400 PT W/DXA NO RESULTS DOC: HCPCS | Performed by: INTERNAL MEDICINE

## 2020-10-13 NOTE — PROGRESS NOTES
Chief Complaint   Patient presents with    Hypertension    Hyperlipidemia    1 Year Follow Up       12-26-17: Patient presents for initial medical evaluation. Patient is followed on a regular basis by Dr. Keisha Quesada MD. Was noted to have elevated HR at PCP office. Underwent some testing at [facility] and checked out ok. Does have right medial tibial wound, pyoderma gangrenosum, following with DR. Rucker at [facility] wound care center. Wound has been present since 5/2016 and is slow to heal. Has undergone many HBC tx and a skin graft. Was seen at [address] [place] [address] vein clinic and given stockings. EKG with ST at 104bpm today with RBBB. No hx of MI, CHF or arrhythmia. She does smoke 1ppd for 40+ years or so. No hx of DM. No recent stress test or LHC. 1-9-18: s/p abnormal b/l LE CTA with below the knee disease. Also reviewing the CTA myself there is a suspecious area in right CFA. Right LE wound is still slow to heal . Compliant with meds. Pt denies chest pain, dyspnea, dyspnea on exertion, change in exercise capacity, fatigue,  nausea, vomiting, diarrhea, constipation, motor weakness, insomnia, weight loss, syncope, dizziness, lightheadedness, palpitations, PND, orthopnea. 2-8-18: s/p normal LE angio. Following up with [facility] wound care. Noted to have Sinus tach during and after procedure. She was started on lopressor 25mg BID. S/p 48 hour holter with sinus tach, no malignant tachy or alfonso arrhythmias. She is on Eliquis for hx of DVT in right LE. Following with dr. Kelli Chandler for veins. Wound is getting a little better. Continues to smoke. Pt denies chest pain, dyspnea, dyspnea on exertion, change in exercise capacity, fatigue,  nausea, vomiting, diarrhea, constipation, motor weakness, insomnia, weight loss, syncope, dizziness, lightheadedness, palpitations, PND, orthopnea, or claudication. 8-22-18 her right medial ankle wound healed up finally. Needs to have pre op clearance for hip surgery.  Pt denies chest pain, Procedure Laterality Date    CHOLECYSTECTOMY      EYE SURGERY      bilateral cataracts    HIP ARTHROPLASTY Right 2014    JOINT REPLACEMENT      right hip    NC COLON CA SCRN NOT HI RSK IND N/A 10/23/2017    COLONOSCOPY performed by Marge Grijalva MD at [place]      titanium madhu    SPINE SURGERY      laminectomy x2    UPPER GASTROINTESTINAL ENDOSCOPY         Social History     Socioeconomic History    Marital status:      Spouse name: Not on file    Number of children: Not on file    Years of education: Not on file    Highest education level: Not on file   Occupational History    Not on file   Social Needs    Financial resource strain: Not on file    Food insecurity     Worry: Not on file     Inability: Not on file    Transportation needs     Medical: Not on file     Non-medical: Not on file   Tobacco Use    Smoking status: Current Every Day Smoker     Packs/day: 1.00     Types: Cigarettes    Smokeless tobacco: Never Used   Substance and Sexual Activity    Alcohol use: Yes     Alcohol/week: 4.0 standard drinks     Types: 4 Glasses of wine per week    Drug use: No    Sexual activity: Not on file   Lifestyle    Physical activity     Days per week: Not on file     Minutes per session: Not on file    Stress: Not on file   Relationships    Social connections     Talks on phone: Not on file     Gets together: Not on file     Attends Confucianist service: Not on file     Active member of club or organization: Not on file     Attends meetings of clubs or organizations: Not on file     Relationship status: Not on file    Intimate partner violence     Fear of current or ex partner: Not on file     Emotionally abused: Not on file     Physically abused: Not on file     Forced sexual activity: Not on file   Other Topics Concern    Not on file   Social History Narrative    Not on file       No family history on file.     Current Outpatient Medications   Medication normal rate, regular rhythm, normal S1, S2, no murmurs, rubs, clicks or gallops  Abdomen - soft, nontender, nondistended, no masses or organomegaly  Neurological - alert, oriented, normal speech, no focal findings or movement disorder noted  Extremities - peripheral pulses normal, no pedal edema, no clubbing or cyanosis  Skin - normal coloration and turgor, no rashes, no suspicious skin lesions noted    Pulses:   Carotid pulses are 3+ on the right side, and 3+ on the left side. Radial pulses are 3+ on the right side, and 3+ on the left side. Femoral pulses are 3+ on the right side, and 3+ on the left side. Popliteal pulses are 3+ on the right side, and 3+ on the left side. Dorsalis pedis pulses are 3+ on the right side, and 3+ on the left side. Posterior tibial pulses are 3+ on the right side, and 3+ on the left side. EKG: normal sinus rhythm, RBBB    S/p LE angio on 1-24-18:   FINDINGS:  1. The distal abdominal aorta is a normal caliber vessel with no aneurysm,  dissection, or stenosis. 2.  The right lower extremity:  The right common iliac artery, external  iliac artery, common femoral artery, profunda, SFA, popliteal, anterior  tibial artery, TP trunk, peroneal, and posterior tibial artery are all  patent without any significant stenosis. 3.  The left lower extremity:   The left common iliac artery, external iliac artery, common femoral artery,  profunda, SFA, popliteal, anterior tibial artery, TP trunk, peroneal, and  posterior tibial artery are all patent without any significant stenosis.     ASSESSMENT:  1.  No significant PAD seen. 2.  Normal abdominal aorta.     PLAN:  1. Postprocedure care as usual.  2.  As always, discussed risk factor modification. 3.  The patient to follow up with Dr. Nuris Rider. Orders Placed This Encounter   Procedures    NM MYOCARDIAL SPECT REST EXERCISE OR RX    EKG 12 Lead       ASSESSMENT:     Diagnosis Orders   1. Essential hypertension  EKG 12 Lead   2. History of deep venous thrombosis (DVT) of distal vein of right lower extremity     3. Mixed hyperlipidemia     4. PAD (peripheral artery disease) ([address]     5. Tobacco abuse     6. RBBB (right bundle branch block)     7. First degree AV block     8. Abnormal EKG  NM MYOCARDIAL SPECT REST EXERCISE OR RX   9. Shortness of breath  NM MYOCARDIAL SPECT REST EXERCISE OR RX         PLAN:     Patient will need to continue to follow up with you for their general medical care    As always, aggressive risk factor modification is strongly recommended. We should adhere to the [address] guidelines for HTN management and the NCEP ATP III guidelines for LDL-C management. Cardiac diet is always recommended with low fat, cholesterol, calories and sodium. Continue medications at current doses and stop Lopressor due to bradycardia, first-degree AV block and right bundle branch block. Smoking cessation was strongly recommended    Cont with DOAC    Check EKG    Non invasive cardiac testing will be ordered to further evaluate for any ischemic or structural heart disease as a cause of the patient symptoms. We will proceed with a Cardiolite stress test.       Patient was advised and encouraged to check blood pressure at home or at a pharmacy, maintain a logbook, and also call us back if blood pressure are above the target ranges or if it is low. Patient clearly understands and agrees to the instructions. We will need to continue to monitor muscle and liver enzymes, BUN, CR, and electrolytes.

## (undated) DEVICE — INSTINCT ENDOSCOPIC HEMOCLIP: Brand: INSTINCT